# Patient Record
Sex: FEMALE | Race: WHITE | Employment: UNEMPLOYED | URBAN - NONMETROPOLITAN AREA
[De-identification: names, ages, dates, MRNs, and addresses within clinical notes are randomized per-mention and may not be internally consistent; named-entity substitution may affect disease eponyms.]

---

## 2016-09-03 LAB
CHOLESTEROL, TOTAL: 192 MG/DL
CHOLESTEROL/HDL RATIO: 2.6
GLUCOSE BLD-MCNC: 79 MG/DL
HDLC SERPL-MCNC: 73 MG/DL (ref 35–70)
LDL CHOLESTEROL CALCULATED: 106.4 MG/DL (ref 0–160)
TRIGL SERPL-MCNC: 63 MG/DL
VLDLC SERPL CALC-MCNC: 13 MG/DL

## 2017-07-05 VITALS
WEIGHT: 146 LBS | HEIGHT: 66 IN | BODY MASS INDEX: 23.46 KG/M2 | DIASTOLIC BLOOD PRESSURE: 60 MMHG | SYSTOLIC BLOOD PRESSURE: 94 MMHG

## 2017-08-14 ENCOUNTER — OFFICE VISIT (OUTPATIENT)
Dept: FAMILY MEDICINE CLINIC | Age: 37
End: 2017-08-14
Payer: COMMERCIAL

## 2017-08-14 VITALS
WEIGHT: 141 LBS | BODY MASS INDEX: 22.66 KG/M2 | HEART RATE: 76 BPM | DIASTOLIC BLOOD PRESSURE: 60 MMHG | SYSTOLIC BLOOD PRESSURE: 100 MMHG | HEIGHT: 66 IN

## 2017-08-14 DIAGNOSIS — K59.00 CONSTIPATION, UNSPECIFIED CONSTIPATION TYPE: Primary | ICD-10-CM

## 2017-08-14 PROCEDURE — G8420 CALC BMI NORM PARAMETERS: HCPCS | Performed by: FAMILY MEDICINE

## 2017-08-14 PROCEDURE — G8427 DOCREV CUR MEDS BY ELIG CLIN: HCPCS | Performed by: FAMILY MEDICINE

## 2017-08-14 PROCEDURE — 99213 OFFICE O/P EST LOW 20 MIN: CPT | Performed by: FAMILY MEDICINE

## 2017-08-14 PROCEDURE — 1036F TOBACCO NON-USER: CPT | Performed by: FAMILY MEDICINE

## 2017-08-14 ASSESSMENT — ENCOUNTER SYMPTOMS
DIARRHEA: 0
CONSTIPATION: 1
ABDOMINAL PAIN: 1
NAUSEA: 0
VOMITING: 0

## 2018-02-01 ENCOUNTER — OFFICE VISIT (OUTPATIENT)
Dept: FAMILY MEDICINE CLINIC | Age: 38
End: 2018-02-01
Payer: COMMERCIAL

## 2018-02-01 VITALS
SYSTOLIC BLOOD PRESSURE: 100 MMHG | DIASTOLIC BLOOD PRESSURE: 70 MMHG | TEMPERATURE: 98.4 F | BODY MASS INDEX: 23.54 KG/M2 | HEART RATE: 72 BPM | WEIGHT: 146.5 LBS

## 2018-02-01 DIAGNOSIS — M26.621 ARTHRALGIA OF RIGHT TEMPOROMANDIBULAR JOINT: ICD-10-CM

## 2018-02-01 DIAGNOSIS — H92.01 OTALGIA OF RIGHT EAR: Primary | ICD-10-CM

## 2018-02-01 PROCEDURE — G8484 FLU IMMUNIZE NO ADMIN: HCPCS | Performed by: FAMILY MEDICINE

## 2018-02-01 PROCEDURE — 99213 OFFICE O/P EST LOW 20 MIN: CPT | Performed by: FAMILY MEDICINE

## 2018-02-01 PROCEDURE — G8427 DOCREV CUR MEDS BY ELIG CLIN: HCPCS | Performed by: FAMILY MEDICINE

## 2018-02-01 PROCEDURE — 1036F TOBACCO NON-USER: CPT | Performed by: FAMILY MEDICINE

## 2018-02-01 PROCEDURE — G8420 CALC BMI NORM PARAMETERS: HCPCS | Performed by: FAMILY MEDICINE

## 2018-02-01 ASSESSMENT — ENCOUNTER SYMPTOMS
RHINORRHEA: 0
SORE THROAT: 0

## 2018-02-01 NOTE — PROGRESS NOTES
[address] [address] [address]  Dept: [phone]  Dept Fax: [phone]    Vicky Davidson is a 42 y.o. female who presents today for her medical conditions/complaints as noted below. Vicky Davidson is c/o of Otalgia ( right ear thinks she had some drainage thru the night, onfoing for about 3 days now)      HPI:     Otalgia    There is pain in the right ear. This is a new problem. The current episode started in the past 7 days (3 days ago). The problem occurs constantly. The problem has been unchanged (worse at night). There has been no fever. The pain is moderate. Pertinent negatives include no headaches, hearing loss, neck pain, rhinorrhea or sore throat. Associated symptoms comments: Headache and pressure vipin on the right side. She has tried NSAIDs for the symptoms. The treatment provided moderate relief. There is no history of a chronic ear infection, hearing loss or a tympanostomy tube.        BP Readings from Last 3 Encounters:   18 100/70   17 100/60   16 94/60          (goal 120/80)    Wt Readings from Last 3 Encounters:   18 146 lb 8 oz (66.5 kg)   17 141 lb (64 kg)   16 146 lb (66.2 kg)        Past Medical History:   Diagnosis Date    Cervical dysplasia     Gestational diabetes 10/12    twins     HGSIL (high grade squamous intraepithelial dysplasia) 2013    Pap smear of cervix with ASCUS, cannot exclude HGSIL 2013    Pneumonia 2007      Past Surgical History:   Procedure Laterality Date    CERVIX SURGERY  13    CO2 LASER ablation of cervix     SECTION      11, 10/10/12 (twins)     SECTION      x2    COLPOSCOPY  13    JESÚS III    TONSILLECTOMY      WISDOM TOOTH EXTRACTION      WISDOM TOOTH EXTRACTION         Family History   Problem Relation Age of Onset    Breast Cancer Maternal Grandmother 62    Diabetes Maternal Grandmother     Other Paternal Grandfather      hodgkins lymphoma    Diabetes Other        Social History   Substance Use Topics    Smoking status: Former Smoker     Quit date: 3/29/2007    Smokeless tobacco: Never Used    Alcohol use No      Comment: rarely      No current outpatient prescriptions on file. No current facility-administered medications for this visit. No Known Allergies    Health Maintenance   Topic Date Due    Flu vaccine (1) 09/01/2017    DTaP/Tdap/Td vaccine (2 - Td) 02/01/2025    HIV screen  Completed       Subjective:      Review of Systems   HENT: Positive for ear pain. Negative for hearing loss, rhinorrhea and sore throat. Musculoskeletal: Negative for neck pain. Neurological: Negative for headaches. Objective:     /70   Pulse 72   Temp 98.4 °F (36.9 °C) (Tympanic)   Wt 146 lb 8 oz (66.5 kg)   BMI 23.54 kg/m²     Physical Exam   Constitutional: She appears well-developed and well-nourished. HENT:   Head: Normocephalic and atraumatic. Right Ear: Tympanic membrane, external ear and ear canal normal.   Left Ear: Tympanic membrane, external ear and ear canal normal.   Nose: No mucosal edema, rhinorrhea, sinus tenderness or nasal deformity. Mouth/Throat: Uvula is midline and oropharynx is clear and moist. No oropharyngeal exudate, posterior oropharyngeal edema or posterior oropharyngeal erythema. Eyes: Pupils are equal, round, and reactive to light. Neck: No thyromegaly present. Cardiovascular: Normal rate. Lymphadenopathy:     She has cervical adenopathy (minimal only). Psychiatric: She has a normal mood and affect. Assessment/Plan:     1. Otalgia of right ear     2. Arthralgia of right temporomandibular joint  Can use the NSAIDS OTC routinely for 2 weeks and then can change to PRN. Can use an OTC  for teeth grinding as well.          Lab Results   Component Value Date    WBC 7.8 08/24/2015    HGB 13.9 08/24/2015    HCT 41.2 08/24/2015    PLT 225 08/24/2015    CHOL 192 09/03/2016    TRIG 63 09/03/2016    HDL 73 (A) 09/03/2016     08/24/2015    K 4.2 08/24/2015     08/24/2015    CREATININE 0.77 08/24/2015    BUN 13 08/24/2015    CO2 24 08/24/2015    GLUF 74 08/03/2012       Return if symptoms worsen or fail to improve. Patient given educational materials - see patient instructions. Discussed use, benefit, and side effects of prescribed medications. All patient questions answered. Pt voiced understanding. Reviewed health maintenance. Instructed to continue current medications, diet and exercise. Patient agreed with treatment plan. Follow up as directed.      Electronically signed by Anneliese Guaman MD on 2/1/2018

## 2018-03-16 ENCOUNTER — OFFICE VISIT (OUTPATIENT)
Dept: FAMILY MEDICINE CLINIC | Age: 38
End: 2018-03-16
Payer: COMMERCIAL

## 2018-03-16 VITALS
BODY MASS INDEX: 22.76 KG/M2 | DIASTOLIC BLOOD PRESSURE: 74 MMHG | SYSTOLIC BLOOD PRESSURE: 102 MMHG | OXYGEN SATURATION: 99 % | RESPIRATION RATE: 16 BRPM | HEIGHT: 67 IN | HEART RATE: 71 BPM | TEMPERATURE: 98.4 F | WEIGHT: 145 LBS

## 2018-03-16 DIAGNOSIS — H65.03 BILATERAL ACUTE SEROUS OTITIS MEDIA, RECURRENCE NOT SPECIFIED: Primary | ICD-10-CM

## 2018-03-16 DIAGNOSIS — F32.0 MILD MAJOR DEPRESSION (HCC): ICD-10-CM

## 2018-03-16 DIAGNOSIS — Z13.31 POSITIVE DEPRESSION SCREENING: ICD-10-CM

## 2018-03-16 PROCEDURE — G8431 POS CLIN DEPRES SCRN F/U DOC: HCPCS | Performed by: NURSE PRACTITIONER

## 2018-03-16 PROCEDURE — G8420 CALC BMI NORM PARAMETERS: HCPCS | Performed by: NURSE PRACTITIONER

## 2018-03-16 PROCEDURE — G8484 FLU IMMUNIZE NO ADMIN: HCPCS | Performed by: NURSE PRACTITIONER

## 2018-03-16 PROCEDURE — 99214 OFFICE O/P EST MOD 30 MIN: CPT | Performed by: NURSE PRACTITIONER

## 2018-03-16 PROCEDURE — G8427 DOCREV CUR MEDS BY ELIG CLIN: HCPCS | Performed by: NURSE PRACTITIONER

## 2018-03-16 PROCEDURE — G0444 DEPRESSION SCREEN ANNUAL: HCPCS | Performed by: NURSE PRACTITIONER

## 2018-03-16 PROCEDURE — 1036F TOBACCO NON-USER: CPT | Performed by: NURSE PRACTITIONER

## 2018-03-16 RX ORDER — CETIRIZINE HYDROCHLORIDE 10 MG/1
10 TABLET ORAL NIGHTLY PRN
Qty: 30 TABLET | Refills: 2 | Status: SHIPPED | OUTPATIENT
Start: 2018-03-16

## 2018-03-16 ASSESSMENT — ENCOUNTER SYMPTOMS
RHINORRHEA: 0
VOMITING: 0
CHOKING: 0
SHORTNESS OF BREATH: 0
CHEST TIGHTNESS: 0
DIARRHEA: 0
DIFFICULTY BREATHING: 1
WHEEZING: 0
COUGH: 0
APNEA: 0
SORE THROAT: 0
STRIDOR: 0

## 2018-03-16 ASSESSMENT — ANXIETY QUESTIONNAIRES
6. BECOMING EASILY ANNOYED OR IRRITABLE: 3-NEARLY EVERY DAY
1. FEELING NERVOUS, ANXIOUS, OR ON EDGE: 3-NEARLY EVERY DAY
4. TROUBLE RELAXING: 3-NEARLY EVERY DAY
3. WORRYING TOO MUCH ABOUT DIFFERENT THINGS: 2-OVER HALF THE DAYS
7. FEELING AFRAID AS IF SOMETHING AWFUL MIGHT HAPPEN: 3-NEARLY EVERY DAY
5. BEING SO RESTLESS THAT IT IS HARD TO SIT STILL: 3-NEARLY EVERY DAY
GAD7 TOTAL SCORE: 19
2. NOT BEING ABLE TO STOP OR CONTROL WORRYING: 2-OVER HALF THE DAYS

## 2018-03-16 ASSESSMENT — PATIENT HEALTH QUESTIONNAIRE - PHQ9
5. POOR APPETITE OR OVEREATING: 1
SUM OF ALL RESPONSES TO PHQ9 QUESTIONS 1 & 2: 4
2. FEELING DOWN, DEPRESSED OR HOPELESS: 2
7. TROUBLE CONCENTRATING ON THINGS, SUCH AS READING THE NEWSPAPER OR WATCHING TELEVISION: 2
3. TROUBLE FALLING OR STAYING ASLEEP: 1
4. FEELING TIRED OR HAVING LITTLE ENERGY: 2
9. THOUGHTS THAT YOU WOULD BE BETTER OFF DEAD, OR OF HURTING YOURSELF: 0
SUM OF ALL RESPONSES TO PHQ QUESTIONS 1-9: 13
1. LITTLE INTEREST OR PLEASURE IN DOING THINGS: 2
6. FEELING BAD ABOUT YOURSELF - OR THAT YOU ARE A FAILURE OR HAVE LET YOURSELF OR YOUR FAMILY DOWN: 1
8. MOVING OR SPEAKING SO SLOWLY THAT OTHER PEOPLE COULD HAVE NOTICED. OR THE OPPOSITE, BEING SO FIGETY OR RESTLESS THAT YOU HAVE BEEN MOVING AROUND A LOT MORE THAN USUAL: 2

## 2018-03-16 NOTE — PROGRESS NOTES
146 lb 8 oz (66.5 kg)   17 141 lb (64 kg)       Temp Readings from Last 3 Encounters:   18 98.4 °F (36.9 °C) (Tympanic)   18 98.4 °F (36.9 °C) (Tympanic)   08/23/15 97.7 °F (36.5 °C) (Oral)       BP Readings from Last 3 Encounters:   18 102/74   18 100/70   17 100/60       Pulse Readings from Last 3 Encounters:   18 71   18 72   17 76              Past Medical History:   Diagnosis Date    Cervical dysplasia     Gestational diabetes 10/12    twins     HGSIL (high grade squamous intraepithelial dysplasia) 2013    Pap smear of cervix with ASCUS, cannot exclude HGSIL 2013    Pneumonia 2007      Past Surgical History:   Procedure Laterality Date    CERVIX SURGERY  13    CO2 LASER ablation of cervix     SECTION      11, 10/10/12 (twins)     SECTION      x2    COLPOSCOPY  13    JESÚS III    TONSILLECTOMY      WISDOM TOOTH EXTRACTION      WISDOM TOOTH EXTRACTION       Family History   Problem Relation Age of Onset    Breast Cancer Maternal Grandmother 62    Diabetes Maternal Grandmother     Other Paternal Grandfather      hodgkins lymphoma    Diabetes Other      Social History   Substance Use Topics    Smoking status: Former Smoker     Quit date: 3/29/2007    Smokeless tobacco: Never Used    Alcohol use No      Comment: rarely      Current Outpatient Prescriptions   Medication Sig Dispense Refill    cetirizine (ZYRTEC) 10 MG tablet Take 1 tablet by mouth nightly as needed for Allergies 30 tablet 2     No current facility-administered medications for this visit. No Known Allergies      Subjective:      Review of Systems   Constitutional: Positive for appetite change and fatigue. HENT: Positive for hearing loss (muffling). Negative for ear discharge, postnasal drip, rhinorrhea and sore throat. Respiratory: Negative for apnea, cough, choking, chest tightness, shortness of breath, wheezing and stridor.

## 2018-03-16 NOTE — PATIENT INSTRUCTIONS
Patient Education   Zyrtec nightly as needed. Consider lexapro 10 mg daily. Call me if you are interested. If you start will see you in 5 to 6 weeks after starting treatment. Follow up  as needed. Learning About Depression Screening  What is depression screening? Depression screening is a way to see if you have depression symptoms. It may be done by a doctor or counselor. This screening is often part of a routine checkup. That's because your mental health is just as important as your physical health. Depression is a medical illness that affects how you feel, think, and act. You may:  · Have less energy. · Lose interest in your daily activities. · Feel sad and grouchy for a long time. Depression is very common. It affects men and women of all ages. Many things can trigger depression. Some people become depressed after they have a stroke or find out they have a major illness like cancer or heart disease. The death of a loved one, a breakup, or changes in the natural brain chemicals may lead to depression. It can run in families. Most experts believe that a combination of family history (a person's genes) and stressful life events can cause depression. What happens during screening? Your doctor may ask about your feelings, any changes in eating habits, your energy level, and your interest in your daily tasks. He or she may ask other questions, such as how well you are sleeping and if you can focus on the things you do. This may be an informal talk between the two of you. Or your doctor may ask you to fill out a quick form and then talk about your answers. Some diseases or changes in your body can cause symptoms that look like depression. So your doctor may do blood tests to help rule out other problems, such as hormone changes, a low thyroid level, or anemia. What happens after screening? If you have signs of depression, your doctor will talk to you about your options.   Doctors usually treat soon. You will begin to feel better gradually. Follow-up care is a key part of your treatment and safety. Be sure to make and go to all appointments, and call your doctor if you are having problems. It's also a good idea to know your test results and keep a list of the medicines you take. How can you care for yourself at home? Be realistic  · If you have a large task to do, break it up into smaller steps you can handle, and just do what you can. · You may want to put off important decisions until your depression has lifted. If you have plans that will have a major impact on your life, such as marriage, divorce, or a job change, try to wait a bit. Talk it over with friends and loved ones who can help you look at the overall picture first.  · Reaching out to people for help is important. Do not isolate yourself. Let your family and friends help you. Find someone you can trust and confide in, and talk to that person. · Be patient, and be kind to yourself. Remember that depression is not your fault and is not something you can overcome with willpower alone. Treatment is necessary for depression, just like for any other illness. Feeling better takes time, and your mood will improve little by little. Stay active  · Stay busy and get outside. Take a walk, or try some other light exercise. · Talk with your doctor about an exercise program. Exercise can help with mild depression. · Go to a movie or concert. Take part in a Baptism activity or other social gathering. Go to a ball game. · Ask a friend to have dinner with you. Take care of yourself  · Eat a balanced diet with plenty of fresh fruits and vegetables, whole grains, and lean protein. If you have lost your appetite, eat small snacks rather than large meals. · Avoid drinking alcohol or using illegal drugs. Do not take medicines that have not been prescribed for you.  They may interfere with medicines you may be taking for depression, or they may make your depression worse. · Take your medicines exactly as they are prescribed. You may start to feel better within 1 to 3 weeks of taking antidepressant medicine. But it can take as many as 6 to 8 weeks to see more improvement. If you have questions or concerns about your medicines, or if you do not notice any improvement by 3 weeks, talk to your doctor. · If you have any side effects from your medicine, tell your doctor. Antidepressants can make you feel tired, dizzy, or nervous. Some people have dry mouth, constipation, headaches, sexual problems, or diarrhea. Many of these side effects are mild and will go away on their own after you have been taking the medicine for a few weeks. Some may last longer. Talk to your doctor if side effects are bothering you too much. You might be able to try a different medicine. · Get enough sleep. If you have problems sleeping:  ¨ Go to bed at the same time every night, and get up at the same time every morning. ¨ Keep your bedroom dark and quiet. ¨ Do not exercise after 5:00 p.m. ¨ Avoid drinks with caffeine after 5:00 p.m. · Avoid sleeping pills unless they are prescribed by the doctor treating your depression. Sleeping pills may make you groggy during the day, and they may interact with other medicine you are taking. · If you have any other illnesses, such as diabetes, heart disease, or high blood pressure, make sure to continue with your treatment. Tell your doctor about all of the medicines you take, including those with or without a prescription. · Keep the numbers for these national suicide hotlines: 7-959-755-TALK (8-779.476.3077) and 1-508-VNERPLR (2-620.621.9773). If you or someone you know talks about suicide or feeling hopeless, get help right away. When should you call for help? Call 911 anytime you think you may need emergency care. For example, call if:  ? · You feel like hurting yourself or someone else.    ? · Someone you know has depression and is about to warranty or liability for your use of this information. Patient Education          escitalopram  Pronunciation:  EE jenn SOLOMON o francis  Brand:  Lexapro  What is the most important information I should know about escitalopram?  You should not use this medicine if you also take pimozide, or if you are being treated with methylene blue injection. Do not use escitalopram if you have taken an MAO inhibitor in the past 14 days. A dangerous drug interaction could occur. MAO inhibitors include isocarboxazid, linezolid, phenelzine, rasagiline, selegiline, and tranylcypromine. Some young people have thoughts about suicide when first taking an antidepressant. Your doctor will need to check your progress at regular visits while you are using vilazodone. Your family or other caregivers should also be alert to changes in your mood or symptoms. Do not give this medicine to anyone under 12 years. What is escitalopram?  Escitalopram is an antidepressant in a group of drugs called selective serotonin reuptake inhibitors (SSRIs). Escitalopram affects chemicals in the brain that may be unbalanced in people with depression or anxiety. Escitalopram is used to treat anxiety in adults. Escitalopram is also used to treat major depressive disorder in adults and adolescents who are at least 15years old. Escitalopram may also be used for purposes not listed in this medication guide. What should I discuss with my healthcare provider before taking escitalopram?  It is dangerous to try and purchase escitalopram on the Internet or from vendors outside of the [place]. Medications distributed from [employer] may contain dangerous ingredients, or may not be distributed by a licensed pharmacy. Samples of escitalopram purchased on the Internet have been found to contain haloperidol (Haldol), a potent antipsychotic drug with dangerous side effects. For more information, contact the [place]  Food and Drug Administration (FDA) or visit time each day. Follow the directions on your prescription label. Measure liquid medicine with the dosing syringe provided, or with a special dose-measuring spoon or medicine cup. If you do not have a dose-measuring device, ask your pharmacist for one. It may take up to 4 weeks or longer before your symptoms improve. Keep using the medication as directed and tell your doctor if your symptoms do not improve. Do not stop using escitalopram suddenly, or you could have unpleasant withdrawal symptoms. Follow your doctor's instructions about tapering your dose. Store at room temperature away from moisture and heat. What happens if I miss a dose? Take the missed dose as soon as you remember. Skip the missed dose if it is almost time for your next scheduled dose. Do not take extra medicine to make up the missed dose. What happens if I overdose? Seek emergency medical attention or call the Poison Help line at 1-391.472.6783. What should I avoid while taking escitalopram?  Avoid taking tryptophan while you are taking escitalopram.  Ask your doctor before taking a nonsteroidal anti-inflammatory drug (NSAID) for pain, arthritis, fever, or swelling. This includes aspirin, ibuprofen (Advil, Motrin), naproxen (Aleve), celecoxib (Celebrex), diclofenac, indomethacin, meloxicam, and others. Using an NSAID with escitalopram may cause you to bruise or bleed easily. Drinking alcohol can increase certain side effects of escitalopram.  Escitalopram may impair your thinking or reactions. Be careful if you drive or do anything that requires you to be alert. What are the possible side effects of escitalopram?  Get emergency medical help if you have signs of an allergic reaction: skin rash or hives; difficulty breathing; swelling of your face, lips, tongue, or throat.   Report any new or worsening symptoms to your doctor, such as: mood or behavior changes, anxiety, panic attacks, trouble sleeping, or if you feel impulsive, irritable, warnings, drug interactions, allergic reactions, or adverse effects. If you have questions about the drugs you are taking, check with your doctor, nurse or pharmacist.  Copyright 4398-2615 [address]: 15.05. Revision date: 9/23/2016. Care instructions adapted under license by [facility] ([facility]). If you have questions about a medical condition or this instruction, always ask your healthcare professional. [place] [address] any warranty or liability for your use of this information.

## 2018-06-14 ENCOUNTER — OFFICE VISIT (OUTPATIENT)
Dept: FAMILY MEDICINE CLINIC | Age: 38
End: 2018-06-14
Payer: COMMERCIAL

## 2018-06-14 VITALS
OXYGEN SATURATION: 99 % | HEART RATE: 72 BPM | DIASTOLIC BLOOD PRESSURE: 64 MMHG | RESPIRATION RATE: 10 BRPM | TEMPERATURE: 98.1 F | BODY MASS INDEX: 23.04 KG/M2 | WEIGHT: 144.9 LBS | SYSTOLIC BLOOD PRESSURE: 112 MMHG

## 2018-06-14 DIAGNOSIS — R31.9 URINARY TRACT INFECTION WITH HEMATURIA, SITE UNSPECIFIED: ICD-10-CM

## 2018-06-14 DIAGNOSIS — L29.0 ANAL ITCHING: Primary | ICD-10-CM

## 2018-06-14 DIAGNOSIS — R53.83 FATIGUE, UNSPECIFIED TYPE: ICD-10-CM

## 2018-06-14 DIAGNOSIS — N39.0 URINARY TRACT INFECTION WITH HEMATURIA, SITE UNSPECIFIED: ICD-10-CM

## 2018-06-14 LAB
BASOPHILS # BLD: 0.1 THOU/MM3
BILIRUBIN, POC: ABNORMAL
BLOOD URINE, POC: ABNORMAL
CLARITY, POC: ABNORMAL
COLOR, POC: ABNORMAL
DIFFERENTIAL: AUTOMATED DIFF
EOSINOPHIL # BLD: 0.08 THOU/MM3
GLUCOSE URINE, POC: ABNORMAL
HCT VFR BLD CALC: 38.2 %
HEMOGLOBIN: 12.2 G/DL
KETONES, POC: ABNORMAL
LEUKOCYTE EST, POC: ABNORMAL
LYMPHOCYTES # BLD: 1.99 THOU/MM3
MCH RBC QN AUTO: 27 PG
MCHC RBC AUTO-ENTMCNC: 31.9 G/DL
MCV RBC AUTO: 84.4 FL
MONOCYTES # BLD: 0.43 THOU/MM3
NEUTROPHILS: 3.12 THOU/MM3
NITRITE, POC: ABNORMAL
PDW BLD-RTO: 12.5 %
PH, POC: 7
PLATELET # BLD: 204 THOU/MM3
PMV BLD AUTO: 7 FL
PROTEIN, POC: ABNORMAL
RBC # BLD: 4.53 M/UL
SPECIFIC GRAVITY, POC: 1
TSH SERPL DL<=0.05 MIU/L-ACNC: 0.78 MIU/ML
URINE CULTURE, ROUTINE: NORMAL
UROBILINOGEN, POC: ABNORMAL
WBC # BLD: 5.72 THOU/ML3

## 2018-06-14 PROCEDURE — G8420 CALC BMI NORM PARAMETERS: HCPCS | Performed by: NURSE PRACTITIONER

## 2018-06-14 PROCEDURE — 81002 URINALYSIS NONAUTO W/O SCOPE: CPT | Performed by: NURSE PRACTITIONER

## 2018-06-14 PROCEDURE — 99213 OFFICE O/P EST LOW 20 MIN: CPT | Performed by: NURSE PRACTITIONER

## 2018-06-14 PROCEDURE — G8427 DOCREV CUR MEDS BY ELIG CLIN: HCPCS | Performed by: NURSE PRACTITIONER

## 2018-06-14 PROCEDURE — 1036F TOBACCO NON-USER: CPT | Performed by: NURSE PRACTITIONER

## 2018-06-14 RX ORDER — NITROFURANTOIN 25; 75 MG/1; MG/1
100 CAPSULE ORAL 2 TIMES DAILY
Qty: 14 CAPSULE | Refills: 0 | Status: SHIPPED | OUTPATIENT
Start: 2018-06-14 | End: 2018-06-21

## 2018-06-14 ASSESSMENT — ENCOUNTER SYMPTOMS
NAUSEA: 0
CONSTIPATION: 0
SHORTNESS OF BREATH: 0
COUGH: 0
WHEEZING: 0
VOMITING: 0
DIARRHEA: 0

## 2018-06-30 ASSESSMENT — ENCOUNTER SYMPTOMS
RECTAL PAIN: 0
BLOOD IN STOOL: 0
ABDOMINAL PAIN: 1
ANAL BLEEDING: 0

## 2018-07-16 ENCOUNTER — OFFICE VISIT (OUTPATIENT)
Dept: FAMILY MEDICINE CLINIC | Age: 38
End: 2018-07-16
Payer: COMMERCIAL

## 2018-07-16 VITALS
BODY MASS INDEX: 23.22 KG/M2 | WEIGHT: 146.06 LBS | OXYGEN SATURATION: 98 % | HEART RATE: 88 BPM | DIASTOLIC BLOOD PRESSURE: 68 MMHG | SYSTOLIC BLOOD PRESSURE: 118 MMHG

## 2018-07-16 DIAGNOSIS — M54.5 LOW BACK PAIN, UNSPECIFIED BACK PAIN LATERALITY, UNSPECIFIED CHRONICITY, WITH SCIATICA PRESENCE UNSPECIFIED: Primary | ICD-10-CM

## 2018-07-16 LAB
BILIRUBIN, POC: NORMAL
BLOOD URINE, POC: NORMAL
CLARITY, POC: CLEAR
COLOR, POC: YELLOW
GLUCOSE URINE, POC: NORMAL
KETONES, POC: NORMAL
LEUKOCYTE EST, POC: NORMAL
NITRITE, POC: NORMAL
PH, POC: 6
PROTEIN, POC: NORMAL
SPECIFIC GRAVITY, POC: 1
UROBILINOGEN, POC: NORMAL

## 2018-07-16 PROCEDURE — G8420 CALC BMI NORM PARAMETERS: HCPCS | Performed by: FAMILY MEDICINE

## 2018-07-16 PROCEDURE — 81002 URINALYSIS NONAUTO W/O SCOPE: CPT | Performed by: FAMILY MEDICINE

## 2018-07-16 PROCEDURE — G8427 DOCREV CUR MEDS BY ELIG CLIN: HCPCS | Performed by: FAMILY MEDICINE

## 2018-07-16 PROCEDURE — 1036F TOBACCO NON-USER: CPT | Performed by: FAMILY MEDICINE

## 2018-07-16 PROCEDURE — 99213 OFFICE O/P EST LOW 20 MIN: CPT | Performed by: FAMILY MEDICINE

## 2018-07-16 ASSESSMENT — ENCOUNTER SYMPTOMS
ABDOMINAL PAIN: 0
BACK PAIN: 1

## 2018-07-16 NOTE — PROGRESS NOTES
laterality, unspecified chronicity, with sciatica presence unspecified  POCT Urinalysis no Micro     Stretches discussed and OTC analgesics as well. Would refer to PT if not improving. No sx of a UTI at this time    Lab Results   Component Value Date    WBC 5.72 06/14/2018    HGB 12.2 06/14/2018    HCT 38.2 06/14/2018     06/14/2018    CHOL 192 09/03/2016    TRIG 63 09/03/2016    HDL 73 (A) 09/03/2016     08/24/2015    K 4.2 08/24/2015     08/24/2015    CREATININE 0.77 08/24/2015    BUN 13 08/24/2015    CO2 24 08/24/2015    TSH 0.78 06/14/2018    GLUF 74 08/03/2012       Return for As scheduled. Patient given educational materials - see patient instructions. Discussed use, benefit, and side effects of prescribed medications. All patient questions answered. Pt voiced understanding. Reviewed health maintenance. Instructed to continue current medications, diet and exercise. Patient agreed with treatment plan. Follow up as directed.      Electronically signed by Alvarado Barraza MD on 7/22/2018

## 2019-06-19 ENCOUNTER — OFFICE VISIT (OUTPATIENT)
Dept: FAMILY MEDICINE CLINIC | Age: 39
End: 2019-06-19
Payer: COMMERCIAL

## 2019-06-19 VITALS
OXYGEN SATURATION: 96 % | SYSTOLIC BLOOD PRESSURE: 108 MMHG | BODY MASS INDEX: 22.61 KG/M2 | HEART RATE: 86 BPM | DIASTOLIC BLOOD PRESSURE: 76 MMHG | WEIGHT: 142.2 LBS

## 2019-06-19 DIAGNOSIS — Z01.419 PAP TEST, AS PART OF ROUTINE GYNECOLOGICAL EXAMINATION: Primary | ICD-10-CM

## 2019-06-19 DIAGNOSIS — Z13.1 SCREENING FOR DIABETES MELLITUS: ICD-10-CM

## 2019-06-19 DIAGNOSIS — Z13.220 SCREENING, LIPID: ICD-10-CM

## 2019-06-19 PROCEDURE — 99396 PREV VISIT EST AGE 40-64: CPT | Performed by: FAMILY MEDICINE

## 2019-06-19 ASSESSMENT — PATIENT HEALTH QUESTIONNAIRE - PHQ9
SUM OF ALL RESPONSES TO PHQ QUESTIONS 1-9: 2
1. LITTLE INTEREST OR PLEASURE IN DOING THINGS: 1
SUM OF ALL RESPONSES TO PHQ QUESTIONS 1-9: 2
SUM OF ALL RESPONSES TO PHQ9 QUESTIONS 1 & 2: 2
2. FEELING DOWN, DEPRESSED OR HOPELESS: 1

## 2019-06-20 LAB — PAP AGE GUIDED HPV: NORMAL

## 2019-06-23 ASSESSMENT — ENCOUNTER SYMPTOMS
CHEST TIGHTNESS: 0
CONSTIPATION: 0
DIARRHEA: 0
SHORTNESS OF BREATH: 0

## 2019-07-01 ENCOUNTER — TELEPHONE (OUTPATIENT)
Dept: FAMILY MEDICINE CLINIC | Age: 39
End: 2019-07-01